# Patient Record
Sex: FEMALE | Race: WHITE
[De-identification: names, ages, dates, MRNs, and addresses within clinical notes are randomized per-mention and may not be internally consistent; named-entity substitution may affect disease eponyms.]

---

## 2020-09-29 ENCOUNTER — HOSPITAL ENCOUNTER (EMERGENCY)
Dept: HOSPITAL 76 - ED | Age: 60
Discharge: HOME | End: 2020-09-29
Payer: COMMERCIAL

## 2020-09-29 VITALS — DIASTOLIC BLOOD PRESSURE: 91 MMHG | SYSTOLIC BLOOD PRESSURE: 142 MMHG

## 2020-09-29 DIAGNOSIS — H10.33: Primary | ICD-10-CM

## 2020-09-29 PROCEDURE — 99282 EMERGENCY DEPT VISIT SF MDM: CPT

## 2020-09-29 PROCEDURE — 99284 EMERGENCY DEPT VISIT MOD MDM: CPT

## 2020-09-29 NOTE — ED PHYSICIAN DOCUMENTATION
History of Present Illness





- Stated complaint


Stated Complaint: EYE IRRITATION





- Chief complaint


Chief Complaint: Heent





- History obtained from


History obtained from: Patient





- History of Present Illness


Timing: Prior to arrival, How many days ago (2)





- Additonal information


Additional information: 


60-year-old female presents to the emergency department for evaluation of what 

was initially right I drainage but now includes the left eye.  She reports that 

2 days ago she began to have some right eye irritation and mild swelling.  She 

wakes up in the morning and often has crusting matting her lashes.  This morning

both of her eyes were matted shut.  She denies eye pain or vision changes.  She 

has had no cough cold congestion or fevers.  She reports that recently she has 

developed a few styes in both of her eyes that she has been able to get to go 

away after applying warm compress.  She does not wear contact lenses.  








Review of Systems


Constitutional: reports: Reviewed and negative


Eyes: reports: Discharge, Irritation.  denies: Loss of vision, Decreased vision,

Photophobia


Ears: reports: Reviewed and negative


Nose: reports: Reviewed and negative


Throat: reports: Reviewed and negative


Cardiac: reports: Reviewed and negative


Respiratory: reports: Reviewed and negative


GI: reports: Reviewed and negative


Skin: reports: Reviewed and negative





PD PAST MEDICAL HISTORY





- Past Medical History


Cardiovascular: Hypertension


Neuro: Migraines


Endocrine/Autoimmune: HyPOthyroidism





- Past Surgical History


/GYN:  section


HEENT: Tonsil/Adenoidectomy





- Present Medications


Home Medications: 


                                Ambulatory Orders











 Medication  Instructions  Recorded  Confirmed


 


Polymyxin B Sulf/Trimethoprim 10 ml OP TID #1 bottle 20 





[Polymyxin B-Tmp Eye Drops]   














- Allergies


Allergies/Adverse Reactions: 


                                    Allergies











Allergy/AdvReac Type Severity Reaction Status Date / Time


 


No Known Drug Allergies Allergy   Verified 20 11:10














- Social History


Does the pt smoke?: No


Smoking Status: Never smoker


Does the pt drink ETOH?: Yes


ETOH Use: Wine


Does the pt have substance abuse?: No





- Immunizations


Immunizations are current?: Yes





PD ED PE NORMAL





- General


General: Alert and oriented X 3, No acute distress, Well developed/nourished





- HEENT


HEENT: Atraumatic, PERRL, Ears normal, Moist mucous membranes, Pharynx benign, 

Other (Mild right conjunctival injection.  Negative fluorescein stain.  No stye,

hordeolum or blepharitis noted.Eye with mild upper and lower lid erythema but no

induration. left eye exam unremarkable.)





- Cardiac


Cardiac: RRR, No murmur





- Respiratory


Respiratory: Clear bilaterally





- Abdomen


Abdomen: Normal bowel sounds, Soft, Non tender, Non distended





Results





- Vitals


Vitals: 





                               Vital Signs - 24 hr











  20





  11:10


 


Temperature 36.9 C


 


Heart Rate 63


 


Respiratory 16





Rate 


 


Blood Pressure 139/81 H


 


O2 Saturation 99








                                     Oxygen











O2 Source                      Room air

















PD MEDICAL DECISION MAKING





- ED course


Complexity details: considered differential, d/w patient


ED course: 


60 year old female presents to the emergency department with progressive eye 

drainage that was initially just in the right eye but now the left for 2 days.  

Her exam is most consistent with a conjunctivitis.  She does note that recently 

she has developed styes that she has been able to alleviate using warm compress.

 No stye present at the time of exam.  My suspicion for a keratitis or iritis is

very low.  This time will prescribe ofloxacin drops and recommend close follow-

up with her ophthalmologist








Departure





- Departure


Disposition: 01 Home, Self Care


Clinical Impression: 


Conjunctivitis


Qualifiers:


 Conjunctivitis type: acute Acute conjunctivitis type: unspecified Laterality: 

bilateral Qualified Code(s): H10.33 - Unspecified acute conjunctivitis, 

bilateral





Condition: Stable


Record reviewed to determine appropriate education?: Yes


Prescriptions: 


Polymyxin B Sulf/Trimethoprim [Polymyxin B-Tmp Eye Drops] 10 ml OP TID #1 bottle


Comments: 


Brito, let us have you continue the warm compresses on both of your eyes.  

Please fill the prescription for the antibiotic drops.  Place 1 drop in both 

eyes 3 times a day for 5 days.  I do recommend that you follow-up closely with 

your ophthalmologist in the near future.





If you develop eye pain, have loss of vision, or feel that your symptoms are not

improving please return for a second look